# Patient Record
Sex: FEMALE | Race: WHITE | NOT HISPANIC OR LATINO | Employment: PART TIME | URBAN - NONMETROPOLITAN AREA
[De-identification: names, ages, dates, MRNs, and addresses within clinical notes are randomized per-mention and may not be internally consistent; named-entity substitution may affect disease eponyms.]

---

## 2020-11-04 ENCOUNTER — HOSPITAL ENCOUNTER (OUTPATIENT)
Facility: MEDICAL CENTER | Age: 45
End: 2020-11-04
Attending: PHYSICIAN ASSISTANT
Payer: OTHER GOVERNMENT

## 2020-11-04 ENCOUNTER — OFFICE VISIT (OUTPATIENT)
Dept: URGENT CARE | Facility: PHYSICIAN GROUP | Age: 45
End: 2020-11-04
Payer: OTHER GOVERNMENT

## 2020-11-04 VITALS
HEIGHT: 63 IN | BODY MASS INDEX: 29.23 KG/M2 | DIASTOLIC BLOOD PRESSURE: 82 MMHG | WEIGHT: 165 LBS | OXYGEN SATURATION: 98 % | HEART RATE: 81 BPM | TEMPERATURE: 98.3 F | SYSTOLIC BLOOD PRESSURE: 112 MMHG

## 2020-11-04 DIAGNOSIS — Z20.822 EXPOSURE TO COVID-19 VIRUS: ICD-10-CM

## 2020-11-04 DIAGNOSIS — J06.9 UPPER RESPIRATORY TRACT INFECTION, UNSPECIFIED TYPE: ICD-10-CM

## 2020-11-04 PROCEDURE — 99204 OFFICE O/P NEW MOD 45 MIN: CPT | Mod: CS | Performed by: PHYSICIAN ASSISTANT

## 2020-11-04 PROCEDURE — U0003 INFECTIOUS AGENT DETECTION BY NUCLEIC ACID (DNA OR RNA); SEVERE ACUTE RESPIRATORY SYNDROME CORONAVIRUS 2 (SARS-COV-2) (CORONAVIRUS DISEASE [COVID-19]), AMPLIFIED PROBE TECHNIQUE, MAKING USE OF HIGH THROUGHPUT TECHNOLOGIES AS DESCRIBED BY CMS-2020-01-R: HCPCS

## 2020-11-04 SDOH — HEALTH STABILITY: MENTAL HEALTH: HOW OFTEN DO YOU HAVE A DRINK CONTAINING ALCOHOL?: NEVER

## 2020-11-05 DIAGNOSIS — J06.9 UPPER RESPIRATORY TRACT INFECTION, UNSPECIFIED TYPE: ICD-10-CM

## 2020-11-05 DIAGNOSIS — Z20.822 EXPOSURE TO COVID-19 VIRUS: ICD-10-CM

## 2020-11-05 LAB
COVID ORDER STATUS COVID19: NORMAL
SARS-COV-2 RNA RESP QL NAA+PROBE: DETECTED
SPECIMEN SOURCE: ABNORMAL

## 2020-11-05 NOTE — PROGRESS NOTES
"Chief Complaint   Patient presents with   • Nasal Congestion     sinus pressure exposed 1 week ago        HISTORY OF PRESENT ILLNESS: Patient is a 45 y.o. female who presents today because she has exposure to Covid positive people in the last week and has a 1 to 2-day history of some nasal congestion and loss of smell and taste.  She has not been taking any medications for her current symptoms.    There are no active problems to display for this patient.      Allergies:Patient has no known allergies.    Current Outpatient Medications Ordered in Epic   Medication Sig Dispense Refill   • SUMAtriptan Succinate (IMITREX PO) Take  by mouth.     • Montelukast Sodium (SINGULAIR PO) Take  by mouth.       No current Epic-ordered facility-administered medications on file.        History reviewed. No pertinent past medical history.    Social History     Tobacco Use   • Smoking status: Never Smoker   • Smokeless tobacco: Never Used   Substance Use Topics   • Alcohol use: Not Currently     Frequency: Never   • Drug use: Never       No family status information on file.   History reviewed. No pertinent family history.    ROS:  Review of Systems   Constitutional: Negative for fever, chills, weight loss and malaise/fatigue.   HENT: Negative for ear pain, nosebleeds, positive for congestion, no sore throat and neck pain.    Eyes: Negative for blurred vision.   Respiratory: Negative for cough, sputum production, shortness of breath and wheezing.    Cardiovascular: Negative for chest pain, palpitations, orthopnea and leg swelling.   Gastrointestinal: Negative for heartburn, nausea, vomiting and abdominal pain.   Genitourinary: Negative for dysuria, urgency and frequency.     Exam:  /82 (BP Location: Right arm, Patient Position: Sitting, BP Cuff Size: Adult)   Pulse 81   Temp 36.8 °C (98.3 °F) (Temporal)   Ht 1.6 m (5' 3\")   Wt 74.8 kg (165 lb)   SpO2 98%   General:  Well nourished, well developed female in " NAD  Head:Normocephalic, atraumatic  Eyes: PERRLA, EOM within normal limits, no conjunctival injection, no scleral icterus, visual fields and acuity grossly intact.  Ears: Normal shape and symmetry, no tenderness, no discharge. External canals are without any significant edema or erythema. Tympanic membranes are without any inflammation, no effusion. Gross auditory acuity is intact  Nose: Symmetrical without tenderness, no discharge.  Mouth: reasonable hygiene, no erythema exudates or tonsillar enlargement.  Neck: no masses, range of motion within normal limits, no tracheal deviation. No obvious thyroid enlargement.  Pulmonary: chest is symmetrical with respiration, no wheezes, crackles, or rhonchi.  Cardiovascular: regular rate and rhythm without murmurs, rubs, or gallops.  Extremities: no clubbing, cyanosis, or edema.    Please note that this dictation was created using voice recognition software. I have made every reasonable attempt to correct obvious errors, but I expect that there are errors of grammar and possibly content that I did not discover before finalizing the note.    Assessment/Plan:  1. Exposure to COVID-19 virus  COVID/SARS COV-2 PCR   2. Upper respiratory tract infection, unspecified type  COVID/SARS COV-2 PCR   Discussed strict isolation until Covid test returns, over-the-counter symptomatic relief as needed    Followup with primary care in the next 7-10 days if not significantly improving, return to the urgent care or go to the emergency room sooner for any worsening of symptoms.

## 2021-12-23 ENCOUNTER — OFFICE VISIT (OUTPATIENT)
Dept: NEUROLOGY | Facility: MEDICAL CENTER | Age: 46
End: 2021-12-23
Attending: PSYCHIATRY & NEUROLOGY
Payer: OTHER GOVERNMENT

## 2021-12-23 VITALS
DIASTOLIC BLOOD PRESSURE: 70 MMHG | BODY MASS INDEX: 26.45 KG/M2 | HEIGHT: 63 IN | WEIGHT: 149.25 LBS | OXYGEN SATURATION: 100 % | HEART RATE: 70 BPM | RESPIRATION RATE: 12 BRPM | TEMPERATURE: 98.4 F | SYSTOLIC BLOOD PRESSURE: 108 MMHG

## 2021-12-23 DIAGNOSIS — G43.009 MIGRAINE WITHOUT AURA AND WITHOUT STATUS MIGRAINOSUS, NOT INTRACTABLE: Primary | ICD-10-CM

## 2021-12-23 PROCEDURE — 99204 OFFICE O/P NEW MOD 45 MIN: CPT | Performed by: PSYCHIATRY & NEUROLOGY

## 2021-12-23 PROCEDURE — 99202 OFFICE O/P NEW SF 15 MIN: CPT | Performed by: PSYCHIATRY & NEUROLOGY

## 2021-12-23 RX ORDER — TOPIRAMATE 50 MG/1
50 TABLET, FILM COATED ORAL 2 TIMES DAILY
Qty: 60 TABLET | Refills: 3 | Status: SHIPPED | OUTPATIENT
Start: 2021-12-23 | End: 2022-01-22

## 2021-12-23 RX ORDER — SUMATRIPTAN 100 MG/1
TABLET, FILM COATED ORAL
Qty: 9 TABLET | Refills: 5 | Status: SHIPPED | OUTPATIENT
Start: 2021-12-23 | End: 2022-01-22

## 2021-12-23 RX ORDER — MONTELUKAST SODIUM 10 MG/1
TABLET ORAL
COMMUNITY
Start: 2021-10-13 | End: 2021-12-23

## 2021-12-23 RX ORDER — TOPIRAMATE 25 MG/1
50 TABLET ORAL DAILY
COMMUNITY
Start: 2021-12-01 | End: 2021-12-23

## 2021-12-23 ASSESSMENT — PATIENT HEALTH QUESTIONNAIRE - PHQ9: CLINICAL INTERPRETATION OF PHQ2 SCORE: 0

## 2021-12-23 NOTE — PROGRESS NOTES
"[facility]  [facility]AL [facility]UROLOG[facility]  NEW PATIENT VISIT    [place] [facility] [place]: \"menstrual migraine...\"    HISTORY OF ILLNESS:  Iwona Dumont is a 46 y.o. woman with a history most notable for migraine w/o aura (possible menstrual migraine).  Today, she was unaccompanied, and she provided the following history:    The following is a summary of headache symptoms, presented in my standard format:    Family History: mother has migraines  Age at onset: 3rd grade  Location: left- or right-sided, often retro-orbital, retro-auricular  Radiation: starts behind eye, goes behind ear  Frequency: 3 days/week  Duration: hours to days  Headache Days/Month: 5-10/30  Quality: \"throbbing, pressure\"  Intensity: 8/10  Aura: none  Photophobia/Phonophobia/Nausea/Vomiting: yes/yes/yes (after hours of symptoms)/no  Provoked by Physical Activity?:   Triggers: hunger (skipping lunch), excessive alcohol, stress  Associated Symptoms:   Autonomic Signs (such as ptosis, miosis, conjunctival injection, rhinorrhea, increased lacrimation):   Head Trauma: fell down the stairs, possible concussion in third grade  Association with Menses: yes, migraine for 3 days prior to cycle, then 2 days afterward  ED Visits: no  Hospitalizations: no  Missed Work Days: no  Sleep: 6-8/night  Caffeine Intake: vitamin ice water, 1-2 coffee-equivalents/day  Hydration: keeps well-hydrated  Nutrition: tries to eat regular meals  Exercise:   Analgesic Overuse:     Current Medication Regimen:  - topiramate: 50 mg daily is ineffective, probably no side effects  - sumatriptan: 25 mg helpful, sometimes re-doses    Medications Tried: Response  Preventive:  - amitriptyline: ineffective, made her feel \"so exhausted\"    Abortive:  -     Medications Not Tried:  - tricyclic antidepressants: already struggles with dry mouth  - propranolol: baseline low blood pressure    MEDICAL AND SURGICAL HISTORY:  Past Medical History:   Diagnosis " Date   • COVID 11/01/2021   • Migraine      Past Surgical History:   Procedure Laterality Date   • PRIMARY C SECTION     • TONSILLECTOMY       MEDICATIONS:  Current Outpatient Medications   Medication Sig   • sumatriptan (IMITREX) 100 MG tablet Take 100 mg at the onset of aura/HA; may re-dose x1 after 2 hrs if HA persists; MDD: 200 mg; do not use >2 days/week.   • topiramate (TOPAMAX) 50 MG tablet Take 1 Tablet by mouth 2 times a day for 30 days.   • Montelukast Sodium (SINGULAIR PO) Take  by mouth.     SOCIAL HISTORY:  Social History     Tobacco Use   • Smoking status: Never Smoker   • Smokeless tobacco: Never Used   Substance Use Topics   • Alcohol use: Not Currently     Comment: rarely/socially     Social History     Social History Narrative   • Not on file     FAMILY HISTORY:  Family History   Problem Relation Age of Onset   • Obesity Mother    • Diabetes Mother    • Seizures Mother    • Diabetes Father    • Obesity Father      REVIEW OF SYSTEMS:  A ROS was completed.  Pertinent positives and negatives were included in the HPI, above.  All other systems were reviewed and are negative.    PHYSICAL EXAM:  General/Medical:  - NAD  - hair, skin, nails, and joints were normal    Neuro:  MENTAL STATUS: awake and alert; no deficits of speech or language; oriented to person, place, and time; affect was appropriate to situation; pleasant, cooperative    CRANIAL NERVES:    II: acuity: J1/J1 without glasses, fields: intact to confrontation, pupils: 3/3 to 2/2 without a relative afferent pupillary defect, discs: sharp    III/IV/VI: versions: intact without nystagmus    V: facial sensation: symmetric to light touch    VII: facial expression: symmetric    VIII: hearing: intact to finger rub    IX/X: palate: elevates symmetrically    XI: shoulder shrug: symmetric    XII: tongue: midline    MOTOR:  - bulk: normal throughout  - tone: normal throughout  Upper Extremity Strength  (R/L)    5/5   Elbow flexion 5/5   Elbow  extension 5/5   Shoulder abduction 5/5     Lower Extremity Strength  (R/L)   Hip flexion 5/5   Knee extension 5/5   Knee flexion 5/5   Ankle plantarflexion 5/5   Ankle dorsiflexion 5/5     - can walk on toes and heels  - pronator drift: absent  - abnormal movements: none    SENSATION:  - light touch: grossly intact over the upper and lower extremities  - vibration (R/L, seconds): NT/NT at the great toes  - pinprick: NT  - proprioception: NT  - Romberg: absent    COORDINATION:  - finger to nose: normal, no ataxia on exam  - finger tapping: rapid and accurate, bilaterally    REFLEXES:  Reflex Right Left   BR 2+ 2+   Biceps 2+ 2+   Triceps 2+ 2+   Patellae 2+ 2+   Achilles NT NT   Toes NT NT     GAIT:  - narrow base and normal  - heel-raised/toe-raised gait: intact/intact  - tandem gait: intact    REVIEW OF IMAGING STUDIES:  No brain imaging available.    REVIEW OF LABORATORY STUDIES:  No recent data available.    ASSESSMENT:  Iwona Dumont is a 46 y.o. woman with migraine w/o aura vs menstrual migraine.  Plans/recommenations as follows:    PLAN:  Migraine w/o Aura vs Menstrual Migraine:  Prevention:  - increase topiramate to 50 mg BID; most common side effects (paraesthesias, cognitive slowing) discussed  - if topiramate is ineffective, could try one of the CGRP-Is (e.g., Aimovig)  - could try supplementing:  - magnesium: 400-600 mg once or 200-300 mg twice daily  - riboflavin (vitamin B2): 400 mg once daily  - coenzyme Q10: 300 mg daily  - get 7-9 hours of sleep per night; can try supplementing melatonin 2-10 mg, 2-3 hours before bedtime  - drink plenty of fluids (urine should be nearly clear)  - avoid excessive caffeine intake (no more than 2 servings per day and nothing in the afternoon)  - eat regular meals (don't skip meals)  - get moderate exercise (even just a 20 minute walk daily)    Rescue:  - increase sumatriptan to 100 mg: take this at the onset of aura or headache pain; do not use more than 2  days/week  - if sumatriptan is ineffective, will switch to a triptan with a longer half-life (e.g., frovatriptan or naratriptan)  - if triptans are ineffective, will try Nurtec dosed PRN and/or q48 hours (preeventive dosing regimen) during critical times of the month  - do not use analgesics (e.g., ibuprofen, acetaminophen) more than 2 days per week in order to avoid analgesic rebound headaches    - keep a headache log    Follow-Up:  - Return in about 2 months (around 2/23/2022).    Signed: William Morillo M.D.

## 2022-02-22 ENCOUNTER — TELEMEDICINE (OUTPATIENT)
Dept: NEUROLOGY | Facility: MEDICAL CENTER | Age: 47
End: 2022-02-22
Attending: PSYCHIATRY & NEUROLOGY
Payer: OTHER GOVERNMENT

## 2022-02-22 VITALS — WEIGHT: 150 LBS | BODY MASS INDEX: 26.58 KG/M2 | HEIGHT: 63 IN

## 2022-02-22 DIAGNOSIS — G43.009 MIGRAINE WITHOUT AURA AND WITHOUT STATUS MIGRAINOSUS, NOT INTRACTABLE: Primary | ICD-10-CM

## 2022-02-22 PROCEDURE — 99214 OFFICE O/P EST MOD 30 MIN: CPT | Mod: GT | Performed by: PSYCHIATRY & NEUROLOGY

## 2022-02-22 RX ORDER — ERENUMAB-AOOE 70 MG/ML
70 INJECTION SUBCUTANEOUS
Qty: 1 ML | Refills: 5 | Status: SHIPPED | OUTPATIENT
Start: 2022-02-22 | End: 2022-05-18 | Stop reason: SDUPTHER

## 2022-02-22 RX ORDER — TOPIRAMATE 50 MG/1
TABLET, FILM COATED ORAL
COMMUNITY
Start: 2022-01-25 | End: 2022-06-27

## 2022-02-22 RX ORDER — SUMATRIPTAN 100 MG/1
TABLET, FILM COATED ORAL
COMMUNITY

## 2022-02-22 RX ORDER — FEXOFENADINE HCL 180 MG/1
TABLET ORAL
COMMUNITY

## 2022-02-22 ASSESSMENT — PATIENT HEALTH QUESTIONNAIRE - PHQ9: CLINICAL INTERPRETATION OF PHQ2 SCORE: 0

## 2022-02-28 ENCOUNTER — TELEPHONE (OUTPATIENT)
Dept: NEUROLOGY | Facility: MEDICAL CENTER | Age: 47
End: 2022-02-28

## 2022-02-28 NOTE — TELEPHONE ENCOUNTER
Aimovig 70mg/ml SOAJ    PA submitted via CM  (Key: [id number]) awaiting response TAT 24-72 hrs. - 02/28/2022 1:46pm

## 2022-03-01 ENCOUNTER — TELEPHONE (OUTPATIENT)
Dept: NEUROLOGY | Facility: MEDICAL CENTER | Age: 47
End: 2022-03-01

## 2022-03-01 NOTE — TELEPHONE ENCOUNTER
Aimovig 70mg/ml SOAJ    PA approved caseId:[id number];Status:Approved;Review Type:Prior Auth;Coverage Start Date:01/29/2022;Coverage End Date:08/27/2022, TC paid copay $38.00 #1ml/30 DS Max 30 DS notifying liaison Gricel Laureano . - 03/01/2022 12:47pm

## 2022-05-18 DIAGNOSIS — G43.009 MIGRAINE WITHOUT AURA AND WITHOUT STATUS MIGRAINOSUS, NOT INTRACTABLE: ICD-10-CM

## 2022-05-18 NOTE — TELEPHONE ENCOUNTER
Received request via: Pharmacy    Was the patient seen in the last year in this department? Yes    lv   12/23/2021  fv    6/27/2022    Does the patient have an active prescription (recently filled or refills available) for medication(s) requested? No

## 2022-05-19 ENCOUNTER — TELEPHONE (OUTPATIENT)
Dept: NEUROLOGY | Facility: MEDICAL CENTER | Age: 47
End: 2022-05-19

## 2022-05-19 RX ORDER — ERENUMAB-AOOE 70 MG/ML
70 INJECTION SUBCUTANEOUS
Qty: 1 ML | Refills: 5 | Status: SHIPPED | OUTPATIENT
Start: 2022-05-19 | End: 2022-06-07 | Stop reason: SDUPTHER

## 2022-05-19 NOTE — TELEPHONE ENCOUNTER
Aimovig 70mg/ml SOAJ    Refill request rec'd via [facility], dennis TC via [place], AMADO paid copay $38.00 #1ml/30 DS however patient has  will have liaison Gricel Laureano release to [facility] listed on order. - 05/19/2022 8:52am

## 2022-05-20 ENCOUNTER — APPOINTMENT (OUTPATIENT)
Dept: NEUROLOGY | Facility: MEDICAL CENTER | Age: 47
End: 2022-05-20
Attending: PSYCHIATRY & NEUROLOGY
Payer: OTHER GOVERNMENT

## 2022-06-07 DIAGNOSIS — G43.009 MIGRAINE WITHOUT AURA AND WITHOUT STATUS MIGRAINOSUS, NOT INTRACTABLE: ICD-10-CM

## 2022-06-08 ENCOUNTER — TELEPHONE (OUTPATIENT)
Dept: NEUROLOGY | Facility: MEDICAL CENTER | Age: 47
End: 2022-06-08

## 2022-06-08 ENCOUNTER — TELEPHONE (OUTPATIENT)
Dept: NEUROLOGY | Facility: MEDICAL CENTER | Age: 47
End: 2022-06-08
Payer: OTHER GOVERNMENT

## 2022-06-08 DIAGNOSIS — G43.009 MIGRAINE WITHOUT AURA AND WITHOUT STATUS MIGRAINOSUS, NOT INTRACTABLE: ICD-10-CM

## 2022-06-08 RX ORDER — ERENUMAB-AOOE 70 MG/ML
70 INJECTION SUBCUTANEOUS
Qty: 1 ML | Refills: 11 | Status: SHIPPED | OUTPATIENT
Start: 2022-06-08 | End: 2022-11-30 | Stop reason: SDUPTHER

## 2022-06-08 NOTE — TELEPHONE ENCOUNTER
Contacted patient at [phone] to discuss [facility] and services/benefits offered. No answer, left voicemail.    Pura Alas  Rx Coordinator   [phone]

## 2022-06-08 NOTE — TELEPHONE ENCOUNTER
Aimovig 140mg/ml SOAJ    Request rec'd via [facility], ran TC via [place], TC paid copay $114.00 #3ml/90 DS notifying liaison [place]. - 06/08/2022 1:53pm    Aimovig 70mg/ml SOAJ    RTS - NEXT RFL [id number] DAYS TO RFL 55 LAST FILL [id number] AT YOUR PHARM FOR EMRG OVD, SCC=13 PER RP DISCRETION [id number] - 06/21/2022 11:30AM

## 2022-06-09 PROCEDURE — RXMED WILLOW AMBULATORY MEDICATION CHARGE: Performed by: REGISTERED NURSE

## 2022-06-10 ENCOUNTER — DOCUMENTATION (OUTPATIENT)
Dept: PHARMACY | Facility: MEDICAL CENTER | Age: 47
End: 2022-06-10
Payer: OTHER GOVERNMENT

## 2022-06-10 NOTE — PROGRESS NOTES
PHARMACIST PRE SCREEN - **New Onboarding**     List Drug Allergies: nka  List Non-Drug Allergies: seaonal  List ICD-10: G43.009  List Diagnosis: Migraine without aura and without status migrainosus, not intractable  List Goals of Therapy:   • To reduce migraine frequency, duration, and severity   • To improve acute medication responsiveness and reduce need for acute attacks   • To identify and modify migraine triggers  Drug Therapy (name/formulation/dose/route of admin/freq): Erenumab (AIMOVIG) 70 MG/ML Solution Auto-injector , 1 pen SC Q month   • Appropriateness Review (Y/N + If being prescribed off label, notate supporting reference): Y    • Dose appropriateness (Y/N + BSA, height/weight if applicable): Y    • Any Renal/Hepatic adjustments needed (Y/N + explain)? N    • Comorbidity and Past Medical History reviewed in EMR. Any comorbidities, PMH, precautions, or contraindications that pose medication safety concern (Y/N + document and reach out to provider if appropriate)? N    • Any relevant lab work needed that affect the initial start date (Y/N + document and reach out to provider if appropriate)?  N   • EMR med list reviewed. List DDI’s: no clinically significant DDIs   • Patient’s ability to self-administer medication: No issues identified per EMR     PHARMACIST NEW START - Migraine Call Review   ICD-10: G43.009  Diagnosis: Migraine without aura and without status migrainosus, not intractable     Tx prescribed: Erenumab (AIMOVIG) 70 MG/ML Solution Auto-injector Q month   • Duration of therapy: until progression, toxicity, or no longer clinically beneficial   • Past Txt: topiramate; sumatriptan; amitryiptyline  Med Rec/Updated drug list: EMR accurate, no medication changes. Reviewed with Iwona, currently tapering off topiramate  Common DI Avoidance: advised not to start anything new without reviewing with providers. Continue to  limit Excedrin use to max of twice per week to limit risk of rebound HA.    Acute Episodic Medication Use:  excedrin migraine; sumatriptan   Other Pertinent Migraine Med: none  DI Check:  no clinically significant DDIs    Goals of Therapy:   · To reduce migraine frequency, duration, and severity   · To improve acute medication responsiveness and reduce need for acute attacks   · To identify and modify migraine triggers  Patient has agreed to/understands goals of therapy during education/counseling   S/Sx(Baseline)   • # of Migraine Days Past 30 Days: 15 days    • Migraine Symptoms - consistent every time she has a migraine. Will typically have migraines occur within a specific timefram surrounding menses - 3 days before, during, and up to 3 days after cycle (i.e. only 2 weeks per month are migraine free on average)     ? Blurred vision    ? No n/v - only if it's super intense she might have nausea at most 2 x /yr    ? Mostly achy pain on the right side around the eyes across the top of the eyebrow to the side and underneath the eye.    ? Tightness/stiffness in neck and shoulders   • Migraine Pain Severity: varies only because the severity decreases with  sumatriptan use    • R side and around the eyes across the top of the eyebrown to the side and underneath and a little   • Average Duration of Migraine (Hours):     ? With sumatriptan, can terminate a migraine within an hour    ? Untreated can last up to 24 hrs  Labs no recent labs   • CBC:    • Chem7:    • LFTs:    • BP/HR:  Admin/Schedule: Erenumab (AIMOVIG) 70 MG/ML Solution Auto-injector , 1 pen SC Q 30 days   • Inj technique: Once the Aimovig is uncapped must be used , pen cannot be recapped. Stretch/pinch inj site to create a firm surface and place pen tip at injection site and maintain pressure at a 90 deg angle until the green safety guard is covered and retracts back into the pen. Press the purple start button until you hear a click, admin has started. Maintain pressure into inj site during this time. Window will change from  clear to solid yellow; admin complete after approx 15 sec, progress window has stopped and second click is heard  Adherence: Advised to dose on the same numerical day every month and using a calendar to help track dates **adherence prediction tool: low risk for nonadherence   • Missed dose mgmt: deferred**   • Barriers (if exist): none     List Common SE Reviewed:    • Constipation: Adq hydration (64 to 80 oz/day); senna; colace; miralax; bisacodyl; increasing dietary fiber (whole grains, fibrous fruits/veggies)   • Injection Site Reactions (swelling, bruising, pain, irritation): ice before/after admin; avoiding/limiting touching inj site after; inj site rotation; top cortisone; oral non-drowsy antihistamine  List Serious SE Reviewed/Precautions:    • Hypersensitivity: Report to MD if any presentation/development of: hives; fever; joint pain; swelling of the tongue/face/lips; SOB/difficulty breathing.  Proper Handling/Storage/Excursion/Disposal: store Aimovig in fridge, protect from heat, light, and freezing. Bring to room temp over 30 minutes but  do not shake, hold, or heat. Once brought to room temp not return to refrigeration, can be kept at room temp (max of 77F) for up to 7 days. After admin do not attempt to disassemble. Discard into sharps container or other impenetrable hard plastic/metal container (e.g. empty laundry detergent bottle, coffee tin), seal well with duct tape and place directly into trash.  Discard when 75% full.  Wellness/Lifestyle:    • Triggers:    ? Skipping meals    ? Eye strain/screen time    ? menses  Risk for falls (use STEADI): n/a  Vaccines: deferred**  ADLs: deferred**  Additional: Had a nice talk with Iwona and welcomed her to our services. She confirmed she does not have any sensitivities/allergies to latex and no fear of needles. Had a long history of allergy injections from childhood and familiar with cleaning technique required prior to admin. Reviewed the importance of  using a 90 deg angle when injection, if using a different angle applied to injection site and then rocking the pen tip could risk bending the needle. Recommended watching instructional videos on [employer] for additional education if she chose. Time to see full benefit of this med can take up to 3 to 4 months and is a preventative medication, to continue sumatriptan use at onset of migraines.  Thanked me for the call and review, welcomes future check-ins with clinical pharmacist team.

## 2022-06-13 ENCOUNTER — PHARMACY VISIT (OUTPATIENT)
Dept: PHARMACY | Facility: MEDICAL CENTER | Age: 47
End: 2022-06-13
Payer: COMMERCIAL

## 2022-06-21 RX ORDER — ERENUMAB-AOOE 70 MG/ML
70 INJECTION SUBCUTANEOUS
Qty: 1 ML | Refills: 5 | Status: SHIPPED | OUTPATIENT
Start: 2022-06-21 | End: 2022-06-22

## 2022-06-22 ENCOUNTER — DOCUMENTATION (OUTPATIENT)
Dept: PHARMACY | Facility: MEDICAL CENTER | Age: 47
End: 2022-06-22
Payer: OTHER GOVERNMENT

## 2022-06-23 NOTE — PROGRESS NOTES
PHARMACIST FOLLOW UP - **First Cycle**  Confirmed Start Date: 6/15/22     Txt review (med/dose/freq/cycle): Erenumab (AIMOVIG) 70 MG/ML Solution Auto-injector Q month  Med Rec/Updated drug list: No changes since last assessment, reviewed with  Iwona    New changes related to health (allergies, health conditions): none  Unplanned medical visits (ER, Hospitalization): none     Admin tips/Adherence: Aimovig 1 pen SC, next due on 7/15  Current SE: none   • Mgt: n/a     ADLs: no days missed  Review from initial:    • Missed dose mgmt:  Inject dose ASAP if within 24 - 48 hrs of typical dosing time and can maintain current montly dosing schedule. If it is has been more than 48 hrs, to dose ASAP and adjust following dose 28 days out (i.e. dosing schedule will shift)      Wellness/Lifestyle: she's aware to continue with daily exercise, sleep and stress management.    Vaccines: recommended the following    • Tdap    • Yearly flu  Additional: Had a brief talk with Iwona who shared things went well with her first injection of Aimovig. Hasn't noticed any changes in her migraines but is very hopeful and aware it can take up to 3 months to see full benefits. No new health concerns or meds since our last talk and feel comfortable with injection technique. No questions/concerns today and thankful for the check in.

## 2022-06-27 ENCOUNTER — OFFICE VISIT (OUTPATIENT)
Dept: NEUROLOGY | Facility: MEDICAL CENTER | Age: 47
End: 2022-06-27
Attending: PSYCHIATRY & NEUROLOGY
Payer: OTHER GOVERNMENT

## 2022-06-27 VITALS
HEIGHT: 63 IN | HEART RATE: 76 BPM | DIASTOLIC BLOOD PRESSURE: 78 MMHG | BODY MASS INDEX: 26.58 KG/M2 | RESPIRATION RATE: 16 BRPM | SYSTOLIC BLOOD PRESSURE: 128 MMHG | TEMPERATURE: 98.4 F | WEIGHT: 150 LBS | OXYGEN SATURATION: 100 %

## 2022-06-27 DIAGNOSIS — G43.009 MIGRAINE WITHOUT AURA AND WITHOUT STATUS MIGRAINOSUS, NOT INTRACTABLE: ICD-10-CM

## 2022-06-27 PROCEDURE — 99211 OFF/OP EST MAY X REQ PHY/QHP: CPT | Performed by: PSYCHIATRY & NEUROLOGY

## 2022-06-27 PROCEDURE — 99213 OFFICE O/P EST LOW 20 MIN: CPT | Performed by: PSYCHIATRY & NEUROLOGY

## 2022-06-27 ASSESSMENT — PAIN SCALES - GENERAL: PAINLEVEL: NO PAIN

## 2022-06-27 NOTE — PROGRESS NOTES
"[facility] NEUROLOGY  GENERAL NEUROLOGY  FOLLOW-UP VISIT    CC: migraine w/o aura vs menstrual migraine    INTERVAL HISTORY:  Iwona Dumont is a 47 y.o. woman with migraine w/o aura vs menstrual migraine .  I last saw her via Zoom on 2/22/2022.  At that time I recommended she taper off topiramate and start Aimovig 70 mg/month.  Today, she was unaccompanied, and she provided the following interval history:    The following is a summary of headache symptoms, presented in my standard format:    Family History: mother has migraines  Age at onset: 3rd grade  Location: left- or right-sided, often retro-orbital, retro-auricular  Radiation: starts behind eye, goes behind ear  Frequency: 3 days/week  Duration: hours to days  Headache Days/Month: 15+/30  Quality: \"throbbing, pressure\"  Intensity: 8/10, lately: 5/10  Aura: none  Photophobia/Phonophobia/Nausea/Vomiting: yes/yes/yes (after hours of symptoms)/no  Provoked by Physical Activity?:   Triggers: hunger (skipping lunch), excessive alcohol, stress  Associated Symptoms:   Autonomic Signs (such as ptosis, miosis, conjunctival injection, rhinorrhea, increased lacrimation):   Head Trauma: fell down the stairs, possible concussion in third grade  Association with Menses: yes, migraine for 3 days prior to cycle, then 2 days afterward  ED Visits: no  Hospitalizations: no  Missed Work Days: no  Sleep: 6-8/night  Caffeine Intake: vitamin ice water, 1-2 coffee-equivalents/day  Hydration: keeps well-hydrated  Nutrition: tries to eat regular meals  Exercise:   Analgesic Overuse:     Current Medication Regimen:  - Aimovig 70: s/p 1 injection, no side effects  - sumatriptan: 50 mg is effective without re-dosing, no side effects    Medications Tried: Response  Preventive:  - amitriptyline: ineffective, made her feel \"so exhausted\"  - topiramate: 50 mg daily is ineffective, probably no side effects  - topiramate: 100 daily causes word-finding difficulty and \"brain " "fog\"     Abortive:  - sumatriptan: 25 mg helpful, sometimes re-doses     Medications Not Tried:  - tricyclic antidepressants: already struggles with dry mouth  - propranolol: baseline low blood pressure    MEDICATIONS:  Current Outpatient Medications   Medication Sig   • Erenumab (AIMOVIG) 70 MG/ML Solution Auto-injector Inject 1 mL under the skin every 30 days.   • Erenumab (AIMOVIG) 70 MG/ML Solution Auto-injector Inject 1 mL under the skin once a month.   • sumatriptan (IMITREX) 100 MG tablet    • fexofenadine (ALLEGRA) 180 MG tablet    • Montelukast Sodium (SINGULAIR PO) Take  by mouth.   • topiramate (TOPAMAX) 50 MG tablet      MEDICAL, SOCIAL, AND FAMILY HISTORY:  There is no change in the patient's ROS or medical, social, or family histories since the previous visit on 2/22/2022.    REVIEW OF SYSTEMS:  A ROS was completed.  Pertinent positives and negatives were included in the HPI, above.  All other systems were reviewed and are negative.    PHYSICAL EXAM:  General/Medical:  - NAD    Neuro:  MENTAL STATUS: awake and alert; no deficits of speech or language; oriented to person, place, and time; affect was appropriate to situation; pleasant, cooperative    CRANIAL NERVES:    II: acuity: NT, fields: NT, pupils: NT, discs: sharp    III/IV/VI: versions: grossly intact    V: facial sensation: NT    VII: facial expression: symmetric    VIII: hearing: intact to voice    IX/X: palate: NT    XI: shoulder shrug: NT    XII: tongue: NT    MOTOR:  - bulk: NT  - tone: NT  Upper Extremity Strength  (R/L)    NT   Elbow flexion NT   Elbow extension NT   Shoulder abduction NT     Lower Extremity Strength  (R/L)   Hip flexion NT   Knee extension NT   Knee flexion NT   Ankle plantarflexion NT   Ankle dorsiflexion NT     - pronator drift: NT  - abnormal movements: none    SENSATION:  - light touch: NT  - vibration (R/L, seconds): NT at the great toes  - pinprick: NT  - proprioception: NT  - Romberg: absent    COORDINATION:  - " finger to nose: NT  - finger tapping: NT    REFLEXES:  Reflex Right Left   BR NT NT   Biceps NT NT   Triceps NT NT   Patellae NT NT   Achilles NT NT   Toes NT NT     GAIT:  - NT    REVIEW OF IMAGING STUDIES:  No additional images since the last visit.    REVIEW OF LABORATORY STUDIES:  No additional data since the last visit.    ASSESSMENT:  Iwona Dumont is a 47 y.o. woman with chronic migraine w/o aura vs menstrual migraine.    PLAN:  Migraine w/o Aura vs Menstrual Migraine:  Prevention:  - continue Aimovig 70 mg/month  - could try supplementing:  - magnesium: 400-600 mg once or 200-300 mg twice daily  - riboflavin (vitamin B2): 400 mg once daily  - coenzyme Q10: 300 mg daily  - get 7-9 hours of sleep per night; can try supplementing melatonin 2-10 mg, 2-3 hours before bedtime  - drink plenty of fluids (urine should be nearly clear)  - avoid excessive caffeine intake (no more than 2 servings per day and nothing in the afternoon)  - eat regular meals (don't skip meals)  - get moderate exercise (even just a 20 minute walk daily)     Rescue:  - continue sumatriptan 50 mg: take this at the onset of aura or headache pain; do not use more than 2 days/week  - do not use analgesics (e.g., ibuprofen, acetaminophen) more than 2 days per week in order to avoid analgesic rebound headaches     - keep a headache log    Follow-Up:  - Return in about 3 months (around 9/27/2022).    Signed: William Morillo M.D.

## 2022-09-06 ENCOUNTER — PHARMACY VISIT (OUTPATIENT)
Dept: PHARMACY | Facility: MEDICAL CENTER | Age: 47
End: 2022-09-06
Payer: COMMERCIAL

## 2022-09-06 PROCEDURE — RXMED WILLOW AMBULATORY MEDICATION CHARGE: Performed by: REGISTERED NURSE

## 2022-09-29 ENCOUNTER — TELEMEDICINE (OUTPATIENT)
Dept: NEUROLOGY | Facility: MEDICAL CENTER | Age: 47
End: 2022-09-29
Attending: PSYCHIATRY & NEUROLOGY
Payer: OTHER GOVERNMENT

## 2022-09-29 VITALS — HEIGHT: 63 IN | BODY MASS INDEX: 26.57 KG/M2

## 2022-09-29 DIAGNOSIS — G43.009 MIGRAINE WITHOUT AURA AND WITHOUT STATUS MIGRAINOSUS, NOT INTRACTABLE: ICD-10-CM

## 2022-09-29 PROCEDURE — 99213 OFFICE O/P EST LOW 20 MIN: CPT | Mod: GT | Performed by: PSYCHIATRY & NEUROLOGY

## 2022-09-29 NOTE — PROGRESS NOTES
"[facility] NEUROLOGY  GENERAL NEUROLOGY  FOLLOW-UP VISIT    This evaluation was conducted via Zoom using secure and encrypted videoconferencing technology. The patient was in their home in the [facility] [place].    The patient's identity was confirmed and verbal consent was obtained for this virtual visit.    CC: migraine w/o aura vs menstrual migraine    INTERVAL HISTORY:  Iwona Dumont is a 47 y.o. woman with migraine w/o aura vs menstrual migraine .  I last saw her in the clinic on 6/27/2022.  At that time I recommended she continue Aimovig 70 mg/month.  Today, I followed up with her via Zoom, and she provided the following interval history:    The following is a summary of headache symptoms, presented in my standard format:    Family History: mother has migraines  Age at onset: 3rd grade  Location: left- or right-sided, often retro-orbital, retro-auricular  Radiation: starts behind eye, goes behind ear  Frequency: baseline: 3 days/week, lately: 1-2/month (1/week in 9/2022)  Duration: baseline: hours to days, lately: 1 hour  Headache Days/Month: 15+/30  Quality: \"throbbing, pressure\"  Intensity: 8/10, lately: 5/10  Aura: none  Photophobia/Phonophobia/Nausea/Vomiting: yes/yes/yes (after hours of symptoms)/no  Provoked by Physical Activity?:   Triggers: hunger (skipping lunch), excessive alcohol, stress  Associated Symptoms:   Autonomic Signs (such as ptosis, miosis, conjunctival injection, rhinorrhea, increased lacrimation):   Head Trauma: fell down the stairs, possible concussion in third grade  Association with Menses: yes, migraine for 3 days prior to cycle, then 2 days afterward  ED Visits: no  Hospitalizations: no  Missed Work Days: no  Sleep: 6-8/night  Caffeine Intake: vitamin ice water, 1-2 coffee-equivalents/day  Hydration: keeps well-hydrated  Nutrition: tries to eat regular meals  Exercise:   Analgesic Overuse:     Current Medication Regimen:  - Aimovig 70: s/p 1 injection, no side effects  - " "sumatriptan: 50 mg is effective without re-dosing, no side effects  - Excedrin: effective    Medications Tried: Response  Preventive:  - amitriptyline: ineffective, made her feel \"so exhausted\"  - topiramate: 50 mg daily is ineffective, probably no side effects  - topiramate: 100 daily causes word-finding difficulty and \"brain fog\"     Abortive:  - sumatriptan: 25 mg helpful, sometimes re-doses     Medications Not Tried:  - tricyclic antidepressants: already struggles with dry mouth  - propranolol: baseline low blood pressure    MEDICATIONS:  Current Outpatient Medications   Medication Sig    Erenumab (AIMOVIG) 70 MG/ML Solution Auto-injector Inject 1 mL under the skin once a month.    sumatriptan (IMITREX) 100 MG tablet     fexofenadine (ALLEGRA) 180 MG tablet     Montelukast Sodium (SINGULAIR PO) Take  by mouth.     MEDICAL, SOCIAL, AND FAMILY HISTORY:  There is no change in the patient's ROS or medical, social, or family histories since the previous visit on 6/27/2022.    REVIEW OF SYSTEMS:  A ROS was completed.  Pertinent positives and negatives were included in the HPI, above.  All other systems were reviewed and are negative.    PHYSICAL EXAM:  General/Medical:  - NAD    Neuro:  MENTAL STATUS: awake and alert; no deficits of speech or language; oriented to conversation; affect was appropriate to situation; pleasant, cooperative    CRANIAL NERVES:    II: acuity: NT, fields: NT, pupils: NT, discs: NT    III/IV/VI: versions: grossly intact    V: facial sensation: NT    VII: facial expression: symmetric    VIII: hearing: intact to voice    IX/X: palate: NT    XI: shoulder shrug: NT    XII: tongue: NT    MOTOR:  - bulk: NT  - tone: NT  Upper Extremity Strength  (R/L)    NT   Elbow flexion NT   Elbow extension NT   Shoulder abduction NT     Lower Extremity Strength  (R/L)   Hip flexion NT   Knee extension NT   Knee flexion NT   Ankle plantarflexion NT   Ankle dorsiflexion NT     - pronator drift: NT  - abnormal " movements: none    SENSATION:  - light touch: NT  - vibration (R/L, seconds): NT at the great toes  - pinprick: NT  - proprioception: NT  - Romberg: absent    COORDINATION:  - finger to nose: NT  - finger tapping: NT    REFLEXES:  Reflex Right Left   BR NT NT   Biceps NT NT   Triceps NT NT   Patellae NT NT   Achilles NT NT   Toes NT NT     GAIT:  - NT    REVIEW OF IMAGING STUDIES:  No additional images since the last visit.    REVIEW OF LABORATORY STUDIES:  No additional data since the last visit.    ASSESSMENT:  Iwona Dumont is a 47 y.o. woman with chronic migraine w/o aura vs menstrual migraine.    PLAN:  Migraine w/o Aura vs Menstrual Migraine:  Prevention:  - continue Aimovig 70 mg/month  - could try supplementing:  - magnesium: 400-600 mg once or 200-300 mg twice daily  - riboflavin (vitamin B2): 400 mg once daily  - coenzyme Q10: 300 mg daily  - get 7-9 hours of sleep per night; can try supplementing melatonin 2-10 mg, 2-3 hours before bedtime  - drink plenty of fluids (urine should be nearly clear)  - avoid excessive caffeine intake (no more than 2 servings per day and nothing in the afternoon)  - eat regular meals (don't skip meals)  - get moderate exercise (even just a 20 minute walk daily)     Rescue:  - continue sumatriptan 50 mg: take this at the onset of aura or headache pain; do not use more than 2 days/week  - do not use analgesics (e.g., ibuprofen, acetaminophen) more than 2 days per week in order to avoid analgesic rebound headaches     - keep a headache log    Follow-Up:  - Return in about 1 year (around 9/29/2023).    Signed: William Morillo M.D.

## 2022-11-08 ENCOUNTER — DOCUMENTATION (OUTPATIENT)
Dept: PHARMACY | Facility: MEDICAL CENTER | Age: 47
End: 2022-11-08
Payer: OTHER GOVERNMENT

## 2022-11-10 NOTE — PROGRESS NOTES
PHARMACIST FOLLOW UP -   F/U Ongoing:   ICD-10: G43.009   Diagnosis: Migraine without aura and without status migrainosus, not intractable    Txt review (med/dose/freq/cycle): Erenumab (AIMOVIG) 70 MG/ML Solution Auto-injector Q month  Med Rec/Updated drug list: EMR inaccurate, medication changes reviewed with patient/caregiver: (new/discontinued meds, dose/frequency of admin changes) (+) albuterol MDI - reports rare use.  DI Check: none  Goals of Therapy:     To reduce migraine frequency, duration, and severity: reduction across all 3 from baseline    To improve acute medication responsiveness and reduce need for acute attacks: sumatriptan 50mg x 1 dose effective for migraine mgmt, for her HA will use excedrin.    To identify and modify migraine triggers: wine is a known trigger and has been limiting consumption; also low BG   Patient has agreed/understands to goals of therapy during education/counseling   S/Sx change(current): no longer having brain fog. No more eye strain or blurred vision with migraine; less migraines overall. Continues to have slight/mild HAs but not nearly intense enough for her to call them a migraine. Improve/same/worsen: improved  New allergy/Dx/hosp/Surg: none  Unplanned medical visits due to condition (ER, Hospitalization): none  Pertinent Labs(date last updated):  no recent labs    1. BP/HR: (6/27/22) wnl      How many migraine days in the past month? 1  -d/t consumption of known trigger(wine )    Pain severity:  migraines are 7 - 8; HA are more of a 4 but no impact on ADLs can still read, drive, etc    Avg duration of migraine in past month: max 2 hours, with Sumatriptan 50mg x 1 effective    Admin/Schedule: Erenumab (AIMOVIG) 70 MG/ML Solution Auto-injector , 1 pen SC Q 30 days - on the 15th of each month. Using her legs and alt R/L   Adherence:  Continues tracking cycles of her HA and doses. Uses an alarm reminder; no missed doses.    Missed dose mgmt: n/a    Barriers (if exist):  none  Current SE: none    Mgt: n/a  Support/QOL: doing very well  Wellness/Lifestyle: Recently went to a wine tasting event with friends, aware to avoid reds and only drinks white. Typically her threshold is half a glass and she had 2 that day. The following day had a migraine but with sumatriptan it was terminated. Lack of eating regular meals is another known trigger so she's been more cognizant of eating routine meal and/or snacks throughout the day.   Vaccines: recommended the following    Tdap    Covid    Flu  ADLs: no days missed  Additional: Had a pleasant and brief talk with Iwona who is extremely pleased with the benefits of Aimovig. From baseline there has been a dramatic improvement in migraine management and prevention and shared she's doing really well. No questions/concerns at this time, and thankful for the continued support.

## 2022-11-30 DIAGNOSIS — G43.009 MIGRAINE WITHOUT AURA AND WITHOUT STATUS MIGRAINOSUS, NOT INTRACTABLE: ICD-10-CM

## 2022-11-30 RX ORDER — ERENUMAB-AOOE 70 MG/ML
70 INJECTION SUBCUTANEOUS
Qty: 1 ML | Refills: 11 | Status: SHIPPED | OUTPATIENT
Start: 2022-11-30

## 2022-11-30 NOTE — TELEPHONE ENCOUNTER
Hello,    Due to pts insurance, she can no longer fill at our pharmacy. [facility].    Thanks,     Magali Laureano, [facility]  RX Coordinator - Neurology & Vascular   [address] [place] [address] [address] [place], [place]  [address]  P: [phone]  C: [phone]  F: [phone]

## 2022-11-30 NOTE — TELEPHONE ENCOUNTER
Received request via: Patient    Was the patient seen in the last year in this department? Yes    Does the patient have an active prescription (recently filled or refills available) for medication(s) requested? No    Does the patient have long-term Plus and need 100 day supply (blood pressure, diabetes and cholesterol meds only)? Medication is not for cholesterol, blood pressure or diabetes    Erenumab (AIMOVIG) 70 MG/ML Solution Auto-injector to [facility] - [facility], [place] [address]

## 2023-10-16 NOTE — PROGRESS NOTES
"[facility] NEUROLOGY  GENERAL NEUROLOGY  FOLLOW-UP VISIT    This evaluation was conducted via Zoom using secure and encrypted videoconferencing technology. The patient was in their home in the [facility].    The patient's identity was confirmed and verbal consent was obtained for this virtual visit.    CC: migraine w/o aura vs menstrual migraine    INTERVAL HISTORY:  Iwona Dumont is a 46 y.o. woman with migraine w/o aura vs menstrual migraine .  I last saw her in the clinic on 12/23/2021.  At that time I recommended she increase topiramate to 50 mg BID and increase sumatriptan to 100 mg PRN.  Today, I followed up with her via Zoom, and she provided the following interval history:    The following is a summary of headache symptoms, presented in my standard format:     Family History: mother has migraines  Age at onset: 3rd grade  Location: left- or right-sided, often retro-orbital, retro-auricular  Radiation: starts behind eye, goes behind ear  Frequency: 3 days/week  Duration: hours to days  Headache Days/Month: 15+/30  Quality: \"throbbing, pressure\"  Intensity: 8/10, lately: 5/10  Aura: none  Photophobia/Phonophobia/Nausea/Vomiting: yes/yes/yes (after hours of symptoms)/no  Provoked by Physical Activity?:   Triggers: hunger (skipping lunch), excessive alcohol, stress  Associated Symptoms:   Autonomic Signs (such as ptosis, miosis, conjunctival injection, rhinorrhea, increased lacrimation):   Head Trauma: fell down the stairs, possible concussion in third grade  Association with Menses: yes, migraine for 3 days prior to cycle, then 2 days afterward  ED Visits: no  Hospitalizations: no  Missed Work Days: no  Sleep: 6-8/night  Caffeine Intake: vitamin ice water, 1-2 coffee-equivalents/day  Hydration: keeps well-hydrated  Nutrition: tries to eat regular meals  Exercise:   Analgesic Overuse:     Current Medication Regimen:  - topiramate: 100 daily causes word-finding difficulty and \"brain fog\"  - " "sumatriptan: 50 mg is effective without re-dosing    Medications Tried: Response  Preventive:  - amitriptyline: ineffective, made her feel \"so exhausted\"  - topiramate: 50 mg daily is ineffective, probably no side effects     Abortive:  - sumatriptan: 25 mg helpful, sometimes re-doses     Medications Not Tried:  - tricyclic antidepressants: already struggles with dry mouth  - propranolol: baseline low blood pressure    MEDICATIONS:  Current Outpatient Medications   Medication Sig   • Montelukast Sodium (SINGULAIR PO) Take  by mouth.     MEDICAL, SOCIAL, AND FAMILY HISTORY:  There is no change in the patient's ROS or medical, social, or family histories since the previous visit on 12/23/2021.    REVIEW OF SYSTEMS:  A ROS was completed.  Pertinent positives and negatives were included in the HPI, above.  All other systems were reviewed and are negative.    PHYSICAL EXAM:  General/Medical:  - NAD    Neuro:  MENTAL STATUS: awake and alert; no deficits of speech or language; oriented to person, place, and time; affect was appropriate to situation; pleasant, cooperative    CRANIAL NERVES:    II: acuity: NT, fields: NT, pupils: NT, discs: NT    III/IV/VI: versions: grossly intact    V: facial sensation: NT    VII: facial expression: symmetric    VIII: hearing: intact to voice    IX/X: palate: NT    XI: shoulder shrug: NT    XII: tongue: NT    MOTOR:  - bulk: NT  - tone: NT  Upper Extremity Strength  (R/L)    NT   Elbow flexion NT   Elbow extension NT   Shoulder abduction NT     Lower Extremity Strength  (R/L)   Hip flexion NT   Knee extension NT   Knee flexion NT   Ankle plantarflexion NT   Ankle dorsiflexion NT     - pronator drift: NT  - abnormal movements: none    SENSATION:  - light touch: NT  - vibration (R/L, seconds): NT at the great toes  - pinprick: NT  - proprioception: NT  - Romberg: absent    COORDINATION:  - finger to nose: NT  - finger tapping: NT    REFLEXES:  Reflex Right Left   BR NT NT   Biceps NT NT "   Triceps NT NT   Patellae NT NT   Achilles NT NT   Toes NT NT     GAIT:  - NT    REVIEW OF IMAGING STUDIES:  No additional images since the last visit.    REVIEW OF LABORATORY STUDIES:  No additional data since the last visit.    ASSESSMENT:  Iwona Dumont is a 46 y.o. woman with chronic migraine w/o aura vs menstrual migraine.    PLAN:  Migraine w/o Aura vs Menstrual Migraine:  Prevention:  - start Aimovig 70 mg/month  - taper off topiramate (side effects)  - could try supplementing:  - magnesium: 400-600 mg once or 200-300 mg twice daily  - riboflavin (vitamin B2): 400 mg once daily  - coenzyme Q10: 300 mg daily  - get 7-9 hours of sleep per night; can try supplementing melatonin 2-10 mg, 2-3 hours before bedtime  - drink plenty of fluids (urine should be nearly clear)  - avoid excessive caffeine intake (no more than 2 servings per day and nothing in the afternoon)  - eat regular meals (don't skip meals)  - get moderate exercise (even just a 20 minute walk daily)     Rescue:  - continue sumatriptan 50 mg: take this at the onset of aura or headache pain; do not use more than 2 days/week  - do not use analgesics (e.g., ibuprofen, acetaminophen) more than 2 days per week in order to avoid analgesic rebound headaches     - keep a headache log    Follow-Up:  - Return in about 3 months (around 5/22/2022).    Signed: William Morillo M.D.   Cephalexin Counseling: I counseled the patient regarding use of cephalexin as an antibiotic for prophylactic and/or therapeutic purposes. Cephalexin (commonly prescribed under brand name Keflex) is a cephalosporin antibiotic which is active against numerous classes of bacteria, including most skin bacteria. Side effects may include nausea, diarrhea, gastrointestinal upset, rash, hives, yeast infections, and in rare cases, hepatitis, kidney disease, seizures, fever, confusion, neurologic symptoms, and others. Patients with severe allergies to penicillin medications are cautioned that there is about a 10% incidence of cross-reactivity with cephalosporins. When possible, patients with penicillin allergies should use alternatives to cephalosporins for antibiotic therapy.